# Patient Record
Sex: FEMALE | Race: WHITE | NOT HISPANIC OR LATINO | Employment: FULL TIME | URBAN - METROPOLITAN AREA
[De-identification: names, ages, dates, MRNs, and addresses within clinical notes are randomized per-mention and may not be internally consistent; named-entity substitution may affect disease eponyms.]

---

## 2017-10-31 ENCOUNTER — OFFICE VISIT (OUTPATIENT)
Dept: INTERNAL MEDICINE | Facility: CLINIC | Age: 22
End: 2017-10-31
Payer: COMMERCIAL

## 2017-10-31 VITALS
BODY MASS INDEX: 22.7 KG/M2 | TEMPERATURE: 97 F | HEIGHT: 67 IN | WEIGHT: 144.63 LBS | DIASTOLIC BLOOD PRESSURE: 84 MMHG | SYSTOLIC BLOOD PRESSURE: 122 MMHG

## 2017-10-31 DIAGNOSIS — Z00.00 PREVENTATIVE HEALTH CARE: ICD-10-CM

## 2017-10-31 DIAGNOSIS — F32.A ANXIETY AND DEPRESSION: Primary | ICD-10-CM

## 2017-10-31 DIAGNOSIS — F41.9 ANXIETY AND DEPRESSION: Primary | ICD-10-CM

## 2017-10-31 PROCEDURE — 99203 OFFICE O/P NEW LOW 30 MIN: CPT | Mod: S$GLB,,, | Performed by: INTERNAL MEDICINE

## 2017-10-31 PROCEDURE — 99999 PR PBB SHADOW E&M-NEW PATIENT-LVL III: CPT | Mod: PBBFAC,,, | Performed by: INTERNAL MEDICINE

## 2017-10-31 RX ORDER — CITALOPRAM 10 MG/1
TABLET ORAL
COMMUNITY
Start: 2016-04-20 | End: 2017-10-31 | Stop reason: ALTCHOICE

## 2017-10-31 RX ORDER — ISOTRETINOIN 40 MG/1
40 CAPSULE ORAL DAILY
COMMUNITY

## 2017-10-31 RX ORDER — IBUPROFEN 200 MG
400 TABLET ORAL
COMMUNITY

## 2017-10-31 RX ORDER — MEDROXYPROGESTERONE ACETATE 150 MG/ML
150 INJECTION, SUSPENSION INTRAMUSCULAR
COMMUNITY
Start: 2016-03-01 | End: 2017-11-28

## 2017-10-31 RX ORDER — DULOXETIN HYDROCHLORIDE 30 MG/1
30 CAPSULE, DELAYED RELEASE ORAL DAILY
Qty: 30 CAPSULE | Refills: 0 | Status: SHIPPED | OUTPATIENT
Start: 2017-10-31 | End: 2017-11-28 | Stop reason: SDUPTHER

## 2017-10-31 RX ORDER — DILTIAZEM HYDROCHLORIDE 120 MG/1
120 CAPSULE, COATED, EXTENDED RELEASE ORAL
COMMUNITY

## 2017-10-31 RX ORDER — CITALOPRAM 20 MG/1
20 TABLET, FILM COATED ORAL DAILY
COMMUNITY
Start: 2016-07-19 | End: 2017-10-31 | Stop reason: ALTCHOICE

## 2017-10-31 RX ORDER — NITROFURANTOIN 25; 75 MG/1; MG/1
CAPSULE ORAL
Refills: 0 | COMMUNITY
Start: 2017-08-25 | End: 2017-11-28 | Stop reason: ALTCHOICE

## 2017-10-31 RX ORDER — ACETAMINOPHEN 325 MG/1
650 TABLET ORAL
COMMUNITY

## 2017-10-31 RX ORDER — DILTIAZEM HYDROCHLORIDE 120 MG/1
120 CAPSULE, EXTENDED RELEASE ORAL
COMMUNITY

## 2017-10-31 RX ORDER — TRETINOIN 0.5 MG/G
CREAM TOPICAL
COMMUNITY
Start: 2015-01-27

## 2017-10-31 NOTE — PROGRESS NOTES
"Subjective:      Patient ID: Uyen Livingston is a 22 y.o. female.    Chief Complaint: Medication Problem (Celexa)    21 yo with There is no problem list on file for this patient. Past Medical History:  No date: Anxiety  No date: Depression  No date: Factor V Leiden  No date: SVT (supraventricular tachycardia)    Here today for management of htn and depression. Declines labs today.    Dx with anxiety and depression in college. Symptoms starting in [employer] high. H/o celexa with improvement in depression. Not much help with anxiety. Could not sleep on paxil so went back to celexa. Back on celexa 20 for over one year. Has h/o counseling. Last session 1.5 years ago. However now feels celexa is not effective for anxiety or depression.  No h/o psyc hospitalization.            Review of Systems   Constitutional: Negative for chills and fever.   Respiratory: Negative for cough, shortness of breath and wheezing.    Cardiovascular: Negative for chest pain.   Gastrointestinal: Negative for abdominal pain.   Neurological: Negative for syncope.   Psychiatric/Behavioral: Positive for dysphoric mood. Negative for hallucinations and suicidal ideas. The patient is nervous/anxious.      Objective:   /84 (BP Location: Right arm, Patient Position: Sitting)   Temp 97.2 °F (36.2 °C) (Tympanic)   Ht 5' 7" (1.702 m)   Wt 65.6 kg (144 lb 10 oz)   LMP 10/31/2015 (Approximate)   BMI 22.65 kg/m²     Physical Exam   Constitutional: She appears well-developed and well-nourished. No distress.   Cardiovascular: Normal rate.    Pulmonary/Chest: Effort normal.   Musculoskeletal: She exhibits no edema.   Neurological: She is alert.   Skin: Skin is warm and dry.   Psychiatric: She has a normal mood and affect. Her behavior is normal.       Assessment:     1. Anxiety and depression    2. Preventative health care      Plan:   Anxiety and depression  -     Ambulatory referral to Social Work  -     DULoxetine (CYMBALTA) 30 MG capsule; Take 1 capsule " (30 mg total) by mouth once daily.  Dispense: 30 capsule; Refill: 0    Preventative health care  -     Comprehensive metabolic panel; Future; Expected date: 10/31/2017  -     CBC auto differential; Future; Expected date: 10/31/2017  -     TSH; Future; Expected date: 10/31/2017  -     Lipid panel; Future; Expected date: 10/31/2017  -     Vitamin D; Future; Expected date: 10/31/2017  -     Hemoglobin A1c; Future; Expected date: 10/31/2017            Problem List Items Addressed This Visit     None      Visit Diagnoses     Anxiety and depression    -  Primary    Relevant Medications    DULoxetine (CYMBALTA) 30 MG capsule    Other Relevant Orders    Ambulatory referral to Social Work    Preventative health care        Relevant Orders    Comprehensive metabolic panel    CBC auto differential    TSH    Lipid panel    Vitamin D    Hemoglobin A1c          Return in about 4 weeks (around 11/28/2017), or if symptoms worsen or fail to improve.

## 2017-11-18 ENCOUNTER — LAB VISIT (OUTPATIENT)
Dept: LAB | Facility: HOSPITAL | Age: 22
End: 2017-11-18
Attending: INTERNAL MEDICINE
Payer: COMMERCIAL

## 2017-11-18 DIAGNOSIS — Z00.00 PREVENTATIVE HEALTH CARE: ICD-10-CM

## 2017-11-18 LAB
25(OH)D3+25(OH)D2 SERPL-MCNC: 23 NG/ML
ALBUMIN SERPL BCP-MCNC: 3.9 G/DL
ALP SERPL-CCNC: 75 U/L
ALT SERPL W/O P-5'-P-CCNC: 17 U/L
ANION GAP SERPL CALC-SCNC: 8 MMOL/L
AST SERPL-CCNC: 26 U/L
BASOPHILS # BLD AUTO: 0.05 K/UL
BASOPHILS NFR BLD: 0.8 %
BILIRUB SERPL-MCNC: 0.7 MG/DL
BUN SERPL-MCNC: 12 MG/DL
CALCIUM SERPL-MCNC: 9.4 MG/DL
CHLORIDE SERPL-SCNC: 105 MMOL/L
CHOLEST SERPL-MCNC: 163 MG/DL
CHOLEST/HDLC SERPL: 2.8 {RATIO}
CO2 SERPL-SCNC: 26 MMOL/L
CREAT SERPL-MCNC: 0.8 MG/DL
DIFFERENTIAL METHOD: NORMAL
EOSINOPHIL # BLD AUTO: 0.3 K/UL
EOSINOPHIL NFR BLD: 3.8 %
ERYTHROCYTE [DISTWIDTH] IN BLOOD BY AUTOMATED COUNT: 12.1 %
EST. GFR  (AFRICAN AMERICAN): >60 ML/MIN/1.73 M^2
EST. GFR  (NON AFRICAN AMERICAN): >60 ML/MIN/1.73 M^2
ESTIMATED AVG GLUCOSE: 91 MG/DL
GLUCOSE SERPL-MCNC: 89 MG/DL
HBA1C MFR BLD HPLC: 4.8 %
HCT VFR BLD AUTO: 41.1 %
HDLC SERPL-MCNC: 59 MG/DL
HDLC SERPL: 36.2 %
HGB BLD-MCNC: 13.7 G/DL
IMM GRANULOCYTES # BLD AUTO: 0.02 K/UL
IMM GRANULOCYTES NFR BLD AUTO: 0.3 %
LDLC SERPL CALC-MCNC: 94.6 MG/DL
LYMPHOCYTES # BLD AUTO: 1.9 K/UL
LYMPHOCYTES NFR BLD: 28.6 %
MCH RBC QN AUTO: 30.3 PG
MCHC RBC AUTO-ENTMCNC: 33.3 G/DL
MCV RBC AUTO: 91 FL
MONOCYTES # BLD AUTO: 0.7 K/UL
MONOCYTES NFR BLD: 10.1 %
NEUTROPHILS # BLD AUTO: 3.7 K/UL
NEUTROPHILS NFR BLD: 56.4 %
NONHDLC SERPL-MCNC: 104 MG/DL
NRBC BLD-RTO: 0 /100 WBC
PLATELET # BLD AUTO: 280 K/UL
PMV BLD AUTO: 11.3 FL
POTASSIUM SERPL-SCNC: 3.7 MMOL/L
PROT SERPL-MCNC: 7.5 G/DL
RBC # BLD AUTO: 4.52 M/UL
SODIUM SERPL-SCNC: 139 MMOL/L
TRIGL SERPL-MCNC: 47 MG/DL
TSH SERPL DL<=0.005 MIU/L-ACNC: 1.36 UIU/ML
WBC # BLD AUTO: 6.61 K/UL

## 2017-11-18 PROCEDURE — 83036 HEMOGLOBIN GLYCOSYLATED A1C: CPT

## 2017-11-18 PROCEDURE — 84443 ASSAY THYROID STIM HORMONE: CPT

## 2017-11-18 PROCEDURE — 82306 VITAMIN D 25 HYDROXY: CPT

## 2017-11-18 PROCEDURE — 80061 LIPID PANEL: CPT

## 2017-11-18 PROCEDURE — 85025 COMPLETE CBC W/AUTO DIFF WBC: CPT

## 2017-11-18 PROCEDURE — 36415 COLL VENOUS BLD VENIPUNCTURE: CPT | Mod: PO

## 2017-11-18 PROCEDURE — 80053 COMPREHEN METABOLIC PANEL: CPT

## 2017-11-22 ENCOUNTER — PATIENT OUTREACH (OUTPATIENT)
Dept: ADMINISTRATIVE | Facility: HOSPITAL | Age: 22
End: 2017-11-22

## 2017-11-28 ENCOUNTER — OFFICE VISIT (OUTPATIENT)
Dept: INTERNAL MEDICINE | Facility: CLINIC | Age: 22
End: 2017-11-28
Payer: COMMERCIAL

## 2017-11-28 VITALS
OXYGEN SATURATION: 99 % | DIASTOLIC BLOOD PRESSURE: 70 MMHG | SYSTOLIC BLOOD PRESSURE: 108 MMHG | TEMPERATURE: 98 F | HEIGHT: 67 IN | HEART RATE: 64 BPM | BODY MASS INDEX: 22.59 KG/M2 | WEIGHT: 143.94 LBS

## 2017-11-28 DIAGNOSIS — E55.9 VITAMIN D INSUFFICIENCY: ICD-10-CM

## 2017-11-28 DIAGNOSIS — F32.A ANXIETY AND DEPRESSION: ICD-10-CM

## 2017-11-28 DIAGNOSIS — H00.012 HORDEOLUM EXTERNUM OF RIGHT LOWER EYELID: ICD-10-CM

## 2017-11-28 DIAGNOSIS — F41.9 ANXIETY AND DEPRESSION: ICD-10-CM

## 2017-11-28 DIAGNOSIS — Z00.00 ROUTINE GENERAL MEDICAL EXAMINATION AT A HEALTH CARE FACILITY: Primary | ICD-10-CM

## 2017-11-28 PROCEDURE — 99395 PREV VISIT EST AGE 18-39: CPT | Mod: S$GLB,,, | Performed by: INTERNAL MEDICINE

## 2017-11-28 PROCEDURE — 99999 PR PBB SHADOW E&M-EST. PATIENT-LVL III: CPT | Mod: PBBFAC,,, | Performed by: INTERNAL MEDICINE

## 2017-11-28 RX ORDER — DULOXETINE 40 MG/1
40 CAPSULE, DELAYED RELEASE ORAL DAILY
Qty: 30 CAPSULE | Refills: 0 | Status: SHIPPED | OUTPATIENT
Start: 2017-11-28 | End: 2017-12-26 | Stop reason: SDUPTHER

## 2017-11-28 RX ORDER — DULOXETINE 40 MG/1
30 CAPSULE, DELAYED RELEASE ORAL DAILY
Qty: 30 CAPSULE | Refills: 0 | Status: SHIPPED | OUTPATIENT
Start: 2017-11-28 | End: 2017-11-28 | Stop reason: CLARIF

## 2017-11-28 NOTE — PROGRESS NOTES
"Subjective:      Patient ID: Uyen Livingston is a 22 y.o. female.    Chief Complaint: Follow-up    23 yo with There is no problem list on file for this patient.  Past Medical History:  No date: Anxiety  No date: Depression  No date: Factor V Leiden  No date: SVT (supraventricular tachycardia)    Here today for annual exam and for f/u on anxiety and depression. Had some improvement with Cymbalta 30 mg bid and affect seem to wane.  Patient is a nonsmoker.  There is no family history of colon cancer.  Patient also reports that she has noticed a stye forming in her right lower eyelid over the last week.  No significant pain.  No discharge.  No vision changes.  None smoker. No f/h of colon cancer.       Review of Systems   Constitutional: Negative for chills and fever.   HENT: Negative for ear pain and sore throat.    Respiratory: Negative for cough.    Cardiovascular: Negative for chest pain.   Gastrointestinal: Negative for abdominal pain and blood in stool.   Genitourinary: Negative for dysuria and hematuria.   Neurological: Negative for seizures and syncope.   Psychiatric/Behavioral: Positive for dysphoric mood. Negative for hallucinations and suicidal ideas. The patient is nervous/anxious.      Objective:   /70 (BP Location: Right arm, Patient Position: Sitting)   Pulse 64   Temp 97.9 °F (36.6 °C) (Tympanic)   Ht 5' 7" (1.702 m)   Wt 65.3 kg (143 lb 15.4 oz)   LMP 10/31/2015 (Approximate)   SpO2 99%   BMI 22.55 kg/m²     Physical Exam   Constitutional: She is oriented to person, place, and time. She appears well-developed and well-nourished. No distress.   HENT:   Head: Normocephalic and atraumatic.   Mouth/Throat: Oropharynx is clear and moist.   Eyes: Conjunctivae and EOM are normal. Pupils are equal, round, and reactive to light. Right eye exhibits no discharge. Left eye exhibits no discharge. No scleral icterus.       Neck: Neck supple. No thyromegaly present.   Cardiovascular: Normal rate and regular " rhythm.    Pulmonary/Chest: Breath sounds normal. She has no wheezes. She has no rales.   Abdominal: Soft. Bowel sounds are normal. There is no tenderness.   Musculoskeletal: She exhibits no edema.   Lymphadenopathy:     She has no cervical adenopathy.   Neurological: She is alert and oriented to person, place, and time.   Skin: Skin is warm and dry.   Psychiatric: She has a normal mood and affect. Her behavior is normal.     Lab Visit on 11/18/2017   Component Date Value Ref Range Status    Sodium 11/18/2017 139  136 - 145 mmol/L Final    Potassium 11/18/2017 3.7  3.5 - 5.1 mmol/L Final    Chloride 11/18/2017 105  95 - 110 mmol/L Final    CO2 11/18/2017 26  23 - 29 mmol/L Final    Glucose 11/18/2017 89  70 - 110 mg/dL Final    BUN, Bld 11/18/2017 12  6 - 20 mg/dL Final    Creatinine 11/18/2017 0.8  0.5 - 1.4 mg/dL Final    Calcium 11/18/2017 9.4  8.7 - 10.5 mg/dL Final    Total Protein 11/18/2017 7.5  6.0 - 8.4 g/dL Final    Albumin 11/18/2017 3.9  3.5 - 5.2 g/dL Final    Total Bilirubin 11/18/2017 0.7  0.1 - 1.0 mg/dL Final    Comment: For infants and newborns, interpretation of results should be based  on gestational age, weight and in agreement with clinical  observations.  Premature Infant recommended reference ranges:  Up to 24 hours.............<8.0 mg/dL  Up to 48 hours............<12.0 mg/dL  3-5 days..................<15.0 mg/dL  6-29 days.................<15.0 mg/dL      Alkaline Phosphatase 11/18/2017 75  55 - 135 U/L Final    AST 11/18/2017 26  10 - 40 U/L Final    ALT 11/18/2017 17  10 - 44 U/L Final    Anion Gap 11/18/2017 8  8 - 16 mmol/L Final    eGFR if  11/18/2017 >60.0  >60 mL/min/1.73 m^2 Final    eGFR if non African American 11/18/2017 >60.0  >60 mL/min/1.73 m^2 Final    Comment: Calculation used to obtain the estimated glomerular filtration  rate (eGFR) is the CKD-EPI equation.       WBC 11/18/2017 6.61  3.90 - 12.70 K/uL Final    RBC 11/18/2017 4.52  4.00 -  5.40 M/uL Final    Hemoglobin 11/18/2017 13.7  12.0 - 16.0 g/dL Final    Hematocrit 11/18/2017 41.1  37.0 - 48.5 % Final    MCV 11/18/2017 91  82 - 98 fL Final    MCH 11/18/2017 30.3  27.0 - 31.0 pg Final    MCHC 11/18/2017 33.3  32.0 - 36.0 g/dL Final    RDW 11/18/2017 12.1  11.5 - 14.5 % Final    Platelets 11/18/2017 280  150 - 350 K/uL Final    MPV 11/18/2017 11.3  9.2 - 12.9 fL Final    Immature Granulocytes 11/18/2017 0.3  0.0 - 0.5 % Final    Gran # 11/18/2017 3.7  1.8 - 7.7 K/uL Final    Immature Grans (Abs) 11/18/2017 0.02  0.00 - 0.04 K/uL Final    Lymph # 11/18/2017 1.9  1.0 - 4.8 K/uL Final    Mono # 11/18/2017 0.7  0.3 - 1.0 K/uL Final    Eos # 11/18/2017 0.3  0.0 - 0.5 K/uL Final    Baso # 11/18/2017 0.05  0.00 - 0.20 K/uL Final    nRBC 11/18/2017 0  0 /100 WBC Final    Gran% 11/18/2017 56.4  38.0 - 73.0 % Final    Lymph% 11/18/2017 28.6  18.0 - 48.0 % Final    Mono% 11/18/2017 10.1  4.0 - 15.0 % Final    Eosinophil% 11/18/2017 3.8  0.0 - 8.0 % Final    Basophil% 11/18/2017 0.8  0.0 - 1.9 % Final    Differential Method 11/18/2017 Automated   Final    TSH 11/18/2017 1.365  0.400 - 4.000 uIU/mL Final    Cholesterol 11/18/2017 163  120 - 199 mg/dL Final    Comment: The National Cholesterol Education Program (NCEP) has set the  following guidelines (reference ranges) for Cholesterol:  Optimal.....................<200 mg/dL  Borderline High.............200-239 mg/dL  High........................> or = 240 mg/dL      Triglycerides 11/18/2017 47  30 - 150 mg/dL Final    Comment: The National Cholesterol Education Program (NCEP) has set the  following guidelines (reference values) for triglycerides:  Normal......................<150 mg/dL  Borderline High.............150-199 mg/dL  High........................200-499 mg/dL      HDL 11/18/2017 59  40 - 75 mg/dL Final    Comment: The National Cholesterol Education Program (NCEP) has set the  following guidelines (reference values) for  HDL Cholesterol:  Low...............<40 mg/dL  Optimal...........>60 mg/dL      LDL Cholesterol 11/18/2017 94.6  63.0 - 159.0 mg/dL Final    Comment: The National Cholesterol Education Program (NCEP) has set the  following guidelines (reference values) for LDL Cholesterol:  Optimal.......................<130 mg/dL  Borderline High...............130-159 mg/dL  High..........................160-189 mg/dL  Very High.....................>190 mg/dL      HDL/Chol Ratio 11/18/2017 36.2  20.0 - 50.0 % Final    Total Cholesterol/HDL Ratio 11/18/2017 2.8  2.0 - 5.0 Final    Non-HDL Cholesterol 11/18/2017 104  mg/dL Final    Comment: Risk category and Non-HDL cholesterol goals:  Coronary heart disease (CHD)or equivalent (10-year risk of CHD >20%):  Non-HDL cholesterol goal     <130 mg/dL  Two or more CHD risk factors and 10-year risk of CHD <= 20%:  Non-HDL cholesterol goal     <160 mg/dL  0 to 1 CHD risk factor:  Non-HDL cholesterol goal     <190 mg/dL      Vit D, 25-Hydroxy 11/18/2017 23* 30 - 96 ng/mL Final    Comment: Vitamin D deficiency.........<10 ng/mL                              Vitamin D insufficiency......10-29 ng/mL       Vitamin D sufficiency........> or equal to 30 ng/mL  Vitamin D toxicity............>100 ng/mL      Hemoglobin A1C 11/18/2017 4.8  4.0 - 5.6 % Final    Comment: According to ADA guidelines, hemoglobin A1c <7.0% represents  optimal control in non-pregnant diabetic patients. Different  metrics may apply to specific patient populations.   Standards of Medical Care in Diabetes-2016.  For the purpose of screening for the presence of diabetes:  <5.7%     Consistent with the absence of diabetes  5.7-6.4%  Consistent with increasing risk for diabetes   (prediabetes)  >or=6.5%  Consistent with diabetes  Currently, no consensus exists for use of hemoglobin A1c  for diagnosis of diabetes for children.  This Hemoglobin A1c assay has significant interference with fetal   hemoglobin   (HbF). The results  are invalid for patients with abnormal amounts of   HbF,   including those with known Hereditary Persistence   of Fetal Hemoglobin. Heterozygous hemoglobin variants (HbAS, HbAC,   HbAD, HbAE, HbA2) do not significantly interfere with this assay;   however, presence of multiple variants in a sample may impact the %   interference.      Estimated Avg Glucose 11/18/2017 91  68 - 131 mg/dL Final         Assessment:     1. Routine general medical examination at a health care facility    2. Anxiety and depression    3. Hordeolum externum of right lower eyelid    4. Vitamin D insufficiency      Plan:   Routine general medical examination at a health care facility  Heart healthy diet and regular exercise  Hm reviewed with pt  Declined hpv vaccine    Anxiety and depression  Try increase cymbalta to 40 mg  -     DULoxetine 40 mg CpDR; Take 30 mg by mouth once daily.  Dispense: 30 capsule; Refill: 0    Hordeolum externum of right lower eyelid  Warm compresses as discussed    Vitamin D insufficiency      Vitamin D3 over counter 1000 IU daily.     Patient reports that she is moving out of town in approximately 2 weeks and will obtain follow-up with a local provider      Problem List Items Addressed This Visit     None      Visit Diagnoses     Routine general medical examination at a health care facility    -  Primary    Anxiety and depression        Relevant Medications    DULoxetine 40 mg CpDR    Hordeolum externum of right lower eyelid        Vitamin D insufficiency              Return if symptoms worsen or fail to improve.

## 2017-12-26 DIAGNOSIS — F41.9 ANXIETY AND DEPRESSION: ICD-10-CM

## 2017-12-26 DIAGNOSIS — F32.A ANXIETY AND DEPRESSION: ICD-10-CM

## 2017-12-26 RX ORDER — DULOXETINE 40 MG/1
CAPSULE, DELAYED RELEASE ORAL
Qty: 30 CAPSULE | Refills: 0 | Status: SHIPPED | OUTPATIENT
Start: 2017-12-26

## 2020-04-14 ENCOUNTER — TELEPHONE (OUTPATIENT)
Dept: ADMINISTRATIVE | Facility: HOSPITAL | Age: 25
End: 2020-04-14